# Patient Record
Sex: MALE | Race: WHITE | NOT HISPANIC OR LATINO | Employment: OTHER | URBAN - NONMETROPOLITAN AREA
[De-identification: names, ages, dates, MRNs, and addresses within clinical notes are randomized per-mention and may not be internally consistent; named-entity substitution may affect disease eponyms.]

---

## 2017-01-01 ENCOUNTER — APPOINTMENT (OUTPATIENT)
Dept: GENERAL RADIOLOGY | Facility: HOSPITAL | Age: 82
End: 2017-01-01

## 2017-01-01 ENCOUNTER — HOSPITAL ENCOUNTER (OUTPATIENT)
Dept: CT IMAGING | Facility: HOSPITAL | Age: 82
Discharge: HOME | End: 2017-07-05
Attending: INTERNAL MEDICINE

## 2017-01-01 ENCOUNTER — OFFICE VISIT (OUTPATIENT)
Dept: PULMONOLOGY | Facility: CLINIC | Age: 82
End: 2017-01-01

## 2017-01-01 ENCOUNTER — HOSPITAL ENCOUNTER (OUTPATIENT)
Dept: CT IMAGING | Facility: HOSPITAL | Age: 82
Discharge: HOME | End: 2017-07-05
Attending: INTERNAL MEDICINE | Admitting: INTERNAL MEDICINE

## 2017-01-01 ENCOUNTER — HOSPITAL ENCOUNTER (OUTPATIENT)
Facility: HOSPITAL | Age: 82
Setting detail: HOSPITAL OUTPATIENT SURGERY
Discharge: HOME | End: 2017-07-20
Attending: INTERNAL MEDICINE | Admitting: INTERNAL MEDICINE

## 2017-01-01 ENCOUNTER — ANESTHESIA EVENT (OUTPATIENT)
Dept: PERIOP | Facility: HOSPITAL | Age: 82
End: 2017-01-01

## 2017-01-01 ENCOUNTER — DOCUMENTATION (OUTPATIENT)
Dept: PULMONOLOGY | Facility: CLINIC | Age: 82
End: 2017-01-01

## 2017-01-01 ENCOUNTER — PREP FOR SURGERY (OUTPATIENT)
Dept: OTHER | Facility: HOSPITAL | Age: 82
End: 2017-01-01

## 2017-01-01 ENCOUNTER — HOSPITAL ENCOUNTER (INPATIENT)
Facility: HOSPITAL | Age: 82
LOS: 1 days | Discharge: HOSPICE | End: 2017-08-23
Attending: FAMILY MEDICINE | Admitting: INTERNAL MEDICINE

## 2017-01-01 ENCOUNTER — TRANSCRIBE ORDERS (OUTPATIENT)
Dept: ADMINISTRATIVE | Facility: HOSPITAL | Age: 82
End: 2017-01-01

## 2017-01-01 ENCOUNTER — HOSPITAL ENCOUNTER (INPATIENT)
Facility: HOSPITAL | Age: 82
LOS: 1 days | End: 2017-09-28
Attending: FAMILY MEDICINE | Admitting: INTERNAL MEDICINE

## 2017-01-01 ENCOUNTER — TELEPHONE (OUTPATIENT)
Dept: PULMONOLOGY | Facility: CLINIC | Age: 82
End: 2017-01-01

## 2017-01-01 ENCOUNTER — ANESTHESIA (OUTPATIENT)
Dept: PERIOP | Facility: HOSPITAL | Age: 82
End: 2017-01-01

## 2017-01-01 VITALS
TEMPERATURE: 97.6 F | OXYGEN SATURATION: 96 % | HEART RATE: 85 BPM | DIASTOLIC BLOOD PRESSURE: 77 MMHG | WEIGHT: 174.1 LBS | HEIGHT: 72 IN | BODY MASS INDEX: 23.58 KG/M2 | SYSTOLIC BLOOD PRESSURE: 143 MMHG | RESPIRATION RATE: 20 BRPM

## 2017-01-01 VITALS
HEIGHT: 72 IN | HEART RATE: 86 BPM | SYSTOLIC BLOOD PRESSURE: 116 MMHG | DIASTOLIC BLOOD PRESSURE: 76 MMHG | OXYGEN SATURATION: 90 % | BODY MASS INDEX: 23.03 KG/M2 | RESPIRATION RATE: 12 BRPM | WEIGHT: 170 LBS | TEMPERATURE: 98 F

## 2017-01-01 VITALS
DIASTOLIC BLOOD PRESSURE: 74 MMHG | RESPIRATION RATE: 20 BRPM | SYSTOLIC BLOOD PRESSURE: 138 MMHG | OXYGEN SATURATION: 92 % | WEIGHT: 173 LBS | TEMPERATURE: 99.1 F | HEIGHT: 72 IN | HEART RATE: 69 BPM | BODY MASS INDEX: 23.43 KG/M2

## 2017-01-01 VITALS
HEIGHT: 72 IN | DIASTOLIC BLOOD PRESSURE: 60 MMHG | WEIGHT: 178 LBS | OXYGEN SATURATION: 95 % | SYSTOLIC BLOOD PRESSURE: 116 MMHG | BODY MASS INDEX: 24.11 KG/M2 | HEART RATE: 84 BPM | TEMPERATURE: 98.1 F

## 2017-01-01 VITALS
OXYGEN SATURATION: 92 % | SYSTOLIC BLOOD PRESSURE: 124 MMHG | HEART RATE: 83 BPM | BODY MASS INDEX: 23.43 KG/M2 | WEIGHT: 173 LBS | RESPIRATION RATE: 18 BRPM | DIASTOLIC BLOOD PRESSURE: 62 MMHG | HEIGHT: 72 IN

## 2017-01-01 DIAGNOSIS — J44.9 CHRONIC OBSTRUCTIVE PULMONARY DISEASE, UNSPECIFIED COPD TYPE (HCC): ICD-10-CM

## 2017-01-01 DIAGNOSIS — R42 DIZZINESS: ICD-10-CM

## 2017-01-01 DIAGNOSIS — R42 DIZZINESS: Primary | ICD-10-CM

## 2017-01-01 DIAGNOSIS — R59.0 MEDIASTINAL LYMPHADENOPATHY: ICD-10-CM

## 2017-01-01 DIAGNOSIS — R63.4 WEIGHT LOSS, UNINTENTIONAL: ICD-10-CM

## 2017-01-01 DIAGNOSIS — N18.9 CKD (CHRONIC KIDNEY DISEASE), UNSPECIFIED STAGE: ICD-10-CM

## 2017-01-01 DIAGNOSIS — G44.89 OTHER HEADACHE SYNDROME: ICD-10-CM

## 2017-01-01 DIAGNOSIS — G44.89 OTHER HEADACHE SYNDROME: Primary | ICD-10-CM

## 2017-01-01 DIAGNOSIS — C34.91 SMALL CELL CARCINOMA OF RIGHT LUNG (HCC): ICD-10-CM

## 2017-01-01 DIAGNOSIS — R59.0 MEDIASTINAL LYMPHADENOPATHY: Primary | ICD-10-CM

## 2017-01-01 DIAGNOSIS — R91.8 LUNG MASS: Primary | ICD-10-CM

## 2017-01-01 DIAGNOSIS — R07.9 CHEST PAIN WITH LOW RISK FOR CARDIAC ETIOLOGY: ICD-10-CM

## 2017-01-01 DIAGNOSIS — R59.0 MEDIASTINAL ADENOPATHY: ICD-10-CM

## 2017-01-01 DIAGNOSIS — R06.02 SHORTNESS OF BREATH: ICD-10-CM

## 2017-01-01 DIAGNOSIS — R93.89 ABNORMAL CT OF THE CHEST: ICD-10-CM

## 2017-01-01 DIAGNOSIS — J41.0 SIMPLE CHRONIC BRONCHITIS (HCC): ICD-10-CM

## 2017-01-01 DIAGNOSIS — J18.9 COMMUNITY ACQUIRED PNEUMONIA: Primary | ICD-10-CM

## 2017-01-01 DIAGNOSIS — C34.90 SMALL CELL LUNG CANCER, UNSPECIFIED LATERALITY (HCC): Primary | ICD-10-CM

## 2017-01-01 DIAGNOSIS — R52 ACUTE PAIN: Primary | ICD-10-CM

## 2017-01-01 LAB
6-ACETYL MORPHINE: NEGATIVE
A-A DO2: 33.2 MMHG (ref 0–300)
ALBUMIN SERPL-MCNC: 4 G/DL (ref 3.4–4.8)
ALBUMIN/GLOB SERPL: 1.2 G/DL (ref 1.5–2.5)
ALP SERPL-CCNC: 121 U/L (ref 40–129)
ALT SERPL W P-5'-P-CCNC: 26 U/L (ref 10–44)
AMPHET+METHAMPHET UR QL: NEGATIVE
ANION GAP SERPL CALCULATED.3IONS-SCNC: 11.2 MMOL/L
ANION GAP SERPL CALCULATED.3IONS-SCNC: 6.1 MMOL/L (ref 3.6–11.2)
ANION GAP SERPL CALCULATED.3IONS-SCNC: 7.4 MMOL/L (ref 3.6–11.2)
APTT PPP: 29.1 SECONDS (ref 23.8–36.1)
ARTERIAL PATENCY WRIST A: POSITIVE
AST SERPL-CCNC: 43 U/L (ref 10–34)
ATMOSPHERIC PRESS: 731 MMHG
BACTERIA SPEC AEROBE CULT: NO GROWTH
BACTERIA SPEC AEROBE CULT: NORMAL
BACTERIA SPEC AEROBE CULT: NORMAL
BARBITURATES UR QL SCN: NEGATIVE
BASE EXCESS BLDA CALC-SCNC: -0.4 MMOL/L
BASOPHILS # BLD AUTO: 0.03 10*3/MM3 (ref 0–0.3)
BASOPHILS NFR BLD AUTO: 0.5 % (ref 0–2)
BDY SITE: ABNORMAL
BENZODIAZ UR QL SCN: POSITIVE
BILIRUB SERPL-MCNC: 0.8 MG/DL (ref 0.2–1.8)
BILIRUB UR QL STRIP: NEGATIVE
BNP SERPL-MCNC: 470 PG/ML (ref 0–100)
BODY TEMPERATURE: 98.7 C
BUN BLD-MCNC: 22 MG/DL (ref 7–20)
BUN BLD-MCNC: 27 MG/DL (ref 7–21)
BUN BLD-MCNC: 32 MG/DL (ref 7–21)
BUN/CREAT SERPL: 18.3 (ref 6.3–21.9)
BUN/CREAT SERPL: 20.1 (ref 7–25)
BUN/CREAT SERPL: 25.6 (ref 7–25)
BUPRENORPHINE SERPL-MCNC: NEGATIVE NG/ML
CALCIUM SPEC-SCNC: 9.1 MG/DL (ref 7.7–10)
CALCIUM SPEC-SCNC: 9.3 MG/DL (ref 8.4–10.2)
CALCIUM SPEC-SCNC: 9.4 MG/DL (ref 7.7–10)
CANNABINOIDS SERPL QL: NEGATIVE
CHLORIDE SERPL-SCNC: 106 MMOL/L (ref 98–107)
CHLORIDE SERPL-SCNC: 111 MMOL/L (ref 99–112)
CHLORIDE SERPL-SCNC: 112 MMOL/L (ref 99–112)
CK MB SERPL-CCNC: 2.51 NG/ML (ref 0–5)
CK MB SERPL-CCNC: 2.88 NG/ML (ref 0–5)
CK MB SERPL-RTO: 2.3 % (ref 0–3)
CK MB SERPL-RTO: 2.4 % (ref 0–3)
CK SERPL-CCNC: 105 U/L (ref 24–204)
CK SERPL-CCNC: 124 U/L (ref 24–204)
CLARITY UR: CLEAR
CO2 SERPL-SCNC: 23.6 MMOL/L (ref 24.3–31.9)
CO2 SERPL-SCNC: 28.9 MMOL/L (ref 24.3–31.9)
CO2 SERPL-SCNC: 29 MMOL/L (ref 26–30)
COCAINE UR QL: NEGATIVE
COHGB MFR BLD: 1.4 % (ref 0–5)
COLOR UR: ABNORMAL
CREAT BLD-MCNC: 1.2 MG/DL (ref 0.6–1.3)
CREAT BLD-MCNC: 1.25 MG/DL (ref 0.43–1.29)
CREAT BLD-MCNC: 1.34 MG/DL (ref 0.43–1.29)
CREAT BLDA-MCNC: 1.2 MG/DL (ref 0.6–1.3)
DEPRECATED RDW RBC AUTO: 45.4 FL (ref 37–54)
DEPRECATED RDW RBC AUTO: 47.7 FL (ref 37–54)
DEPRECATED RDW RBC AUTO: 47.8 FL (ref 37–54)
EOSINOPHIL # BLD AUTO: 0.03 10*3/MM3 (ref 0–0.7)
EOSINOPHIL NFR BLD AUTO: 0.5 % (ref 0–7)
ERYTHROCYTE [DISTWIDTH] IN BLOOD BY AUTOMATED COUNT: 13.9 % (ref 11.5–14.5)
ERYTHROCYTE [DISTWIDTH] IN BLOOD BY AUTOMATED COUNT: 14.7 % (ref 11.5–14.5)
ERYTHROCYTE [DISTWIDTH] IN BLOOD BY AUTOMATED COUNT: 14.7 % (ref 11.5–14.5)
GFR SERPL CREATININE-BSD FRML MDRD: 51 ML/MIN/1.73
GFR SERPL CREATININE-BSD FRML MDRD: 55 ML/MIN/1.73
GFR SERPL CREATININE-BSD FRML MDRD: 58 ML/MIN/1.73
GLOBULIN UR ELPH-MCNC: 3.3 GM/DL
GLUCOSE BLD-MCNC: 101 MG/DL (ref 70–110)
GLUCOSE BLD-MCNC: 104 MG/DL (ref 74–98)
GLUCOSE BLD-MCNC: 97 MG/DL (ref 70–110)
GLUCOSE UR STRIP-MCNC: NEGATIVE MG/DL
HCO3 BLDA-SCNC: 23.1 MMOL/L (ref 22–26)
HCT VFR BLD AUTO: 39.4 % (ref 42–52)
HCT VFR BLD AUTO: 41.7 % (ref 42–52)
HCT VFR BLD AUTO: 42.5 % (ref 42–52)
HCT VFR BLD CALC: 39 % (ref 42–52)
HGB BLD-MCNC: 12.2 G/DL (ref 14–18)
HGB BLD-MCNC: 12.9 G/DL (ref 14–18)
HGB BLD-MCNC: 13.2 G/DL (ref 14–18)
HGB BLDA-MCNC: 13.3 G/DL (ref 12–16)
HGB UR QL STRIP.AUTO: NEGATIVE
HOLD SPECIMEN: NORMAL
HOLD SPECIMEN: NORMAL
HOROWITZ INDEX BLD+IHG-RTO: 21 %
IMM GRANULOCYTES # BLD: 0.02 10*3/MM3 (ref 0–0.03)
IMM GRANULOCYTES NFR BLD: 0.3 % (ref 0–0.5)
INR PPP: 1.2 (ref 0.9–1.1)
KETONES UR QL STRIP: NEGATIVE
LAB AP CASE REPORT: NORMAL
LAB AP CASE REPORT: NORMAL
LAB AP CLINICAL INFORMATION: NORMAL
LAB AP CLINICAL INFORMATION: NORMAL
LAB AP DIAGNOSIS COMMENT: NORMAL
LEUKOCYTE ESTERASE UR QL STRIP.AUTO: NEGATIVE
LYMPHOCYTES # BLD AUTO: 2.33 10*3/MM3 (ref 1–3)
LYMPHOCYTES NFR BLD AUTO: 38.1 % (ref 16–46)
Lab: NORMAL
Lab: NORMAL
MCH RBC QN AUTO: 27.9 PG (ref 27–31)
MCH RBC QN AUTO: 28.2 PG (ref 27–33)
MCH RBC QN AUTO: 28.5 PG (ref 27–33)
MCHC RBC AUTO-ENTMCNC: 30.9 G/DL (ref 33–37)
MCHC RBC AUTO-ENTMCNC: 31 G/DL (ref 33–37)
MCHC RBC AUTO-ENTMCNC: 31.1 G/DL (ref 30–37)
MCV RBC AUTO: 89.9 FL (ref 80–94)
MCV RBC AUTO: 91.2 FL (ref 80–94)
MCV RBC AUTO: 92.1 FL (ref 80–94)
METHADONE UR QL SCN: NEGATIVE
METHGB BLD QL: 0.2 % (ref 0–3)
MODALITY: ABNORMAL
MONOCYTES # BLD AUTO: 0.56 10*3/MM3 (ref 0.1–0.9)
MONOCYTES NFR BLD AUTO: 9.2 % (ref 0–12)
NEUTROPHILS # BLD AUTO: 3.15 10*3/MM3 (ref 1.4–6.5)
NEUTROPHILS NFR BLD AUTO: 51.4 % (ref 40–75)
NITRITE UR QL STRIP: NEGATIVE
OPIATES UR QL: NEGATIVE
OSMOLALITY SERPL CALC.SUM OF ELEC: 290.2 MOSM/KG (ref 273–305)
OSMOLALITY SERPL CALC.SUM OF ELEC: 297.5 MOSM/KG (ref 273–305)
OXYCODONE UR QL SCN: NEGATIVE
OXYHGB MFR BLDV: 93 % (ref 85–100)
PCO2 BLDA: 34.3 MM HG (ref 35–45)
PCP UR QL SCN: NEGATIVE
PH BLDA: 7.45 PH UNITS (ref 7.35–7.45)
PH UR STRIP.AUTO: 6.5 [PH] (ref 5–8)
PLATELET # BLD AUTO: 175 10*3/MM3 (ref 130–400)
PLATELET # BLD AUTO: 177 10*3/MM3 (ref 130–400)
PLATELET # BLD AUTO: 209 10*3/MM3 (ref 130–400)
PMV BLD AUTO: 10.1 FL (ref 6–12)
PMV BLD AUTO: 10.6 FL (ref 6–10)
PMV BLD AUTO: 10.8 FL (ref 6–10)
PO2 BLDA: 68.7 MM HG (ref 80–100)
POTASSIUM BLD-SCNC: 4.1 MMOL/L (ref 3.5–5.3)
POTASSIUM BLD-SCNC: 4.7 MMOL/L (ref 3.5–5.3)
POTASSIUM BLD-SCNC: 5.2 MMOL/L (ref 3.5–5.1)
PROT SERPL-MCNC: 7.3 G/DL (ref 6–8)
PROT UR QL STRIP: ABNORMAL
PROTHROMBIN TIME: 15.3 SECONDS (ref 11–15.4)
RBC # BLD AUTO: 4.28 10*6/MM3 (ref 4.7–6.1)
RBC # BLD AUTO: 4.57 10*6/MM3 (ref 4.7–6.1)
RBC # BLD AUTO: 4.73 10*6/MM3 (ref 4.7–6.1)
SAO2 % BLDCOA: 94.5 % (ref 90–100)
SODIUM BLD-SCNC: 141 MMOL/L (ref 137–145)
SODIUM BLD-SCNC: 142 MMOL/L (ref 135–153)
SODIUM BLD-SCNC: 147 MMOL/L (ref 135–153)
SP GR UR STRIP: 1.02 (ref 1–1.03)
T4 SERPL-MCNC: 7.2 MCG/DL (ref 4.5–10.9)
TROPONIN I SERPL-MCNC: 0.01 NG/ML
TROPONIN I SERPL-MCNC: 0.01 NG/ML
TROPONIN I SERPL-MCNC: <0.006 NG/ML
TSH SERPL DL<=0.05 MIU/L-ACNC: 2.64 MIU/ML (ref 0.55–4.78)
UROBILINOGEN UR QL STRIP: ABNORMAL
WBC NRBC COR # BLD: 6.12 10*3/MM3 (ref 4.5–12.5)
WBC NRBC COR # BLD: 8.06 10*3/MM3 (ref 4.8–10.8)
WBC NRBC COR # BLD: 9.54 10*3/MM3 (ref 4.5–12.5)
WHOLE BLOOD HOLD SPECIMEN: NORMAL
WHOLE BLOOD HOLD SPECIMEN: NORMAL

## 2017-01-01 PROCEDURE — 94799 UNLISTED PULMONARY SVC/PX: CPT

## 2017-01-01 PROCEDURE — 87040 BLOOD CULTURE FOR BACTERIA: CPT | Performed by: FAMILY MEDICINE

## 2017-01-01 PROCEDURE — 71260 CT THORAX DX C+: CPT

## 2017-01-01 PROCEDURE — 74160 CT ABDOMEN W/CONTRAST: CPT | Performed by: RADIOLOGY

## 2017-01-01 PROCEDURE — 71010 HC CHEST PA OR AP: CPT

## 2017-01-01 PROCEDURE — 80307 DRUG TEST PRSMV CHEM ANLYZR: CPT | Performed by: FAMILY MEDICINE

## 2017-01-01 PROCEDURE — 85027 COMPLETE CBC AUTOMATED: CPT | Performed by: INTERNAL MEDICINE

## 2017-01-01 PROCEDURE — 85610 PROTHROMBIN TIME: CPT | Performed by: FAMILY MEDICINE

## 2017-01-01 PROCEDURE — 25010000002 FENTANYL CITRATE (PF) 100 MCG/2ML SOLUTION: Performed by: NURSE ANESTHETIST, CERTIFIED REGISTERED

## 2017-01-01 PROCEDURE — 0 IOPAMIDOL 61 % SOLUTION: Performed by: INTERNAL MEDICINE

## 2017-01-01 PROCEDURE — 25010000002 PROPOFOL 200 MG/20ML EMULSION: Performed by: NURSE ANESTHETIST, CERTIFIED REGISTERED

## 2017-01-01 PROCEDURE — 93010 ELECTROCARDIOGRAM REPORT: CPT | Performed by: INTERNAL MEDICINE

## 2017-01-01 PROCEDURE — 31624 DX BRONCHOSCOPE/LAVAGE: CPT | Performed by: INTERNAL MEDICINE

## 2017-01-01 PROCEDURE — 82550 ASSAY OF CK (CPK): CPT | Performed by: INTERNAL MEDICINE

## 2017-01-01 PROCEDURE — 25010000002 MORPHINE SULFATE (PF) 2 MG/ML SOLUTION: Performed by: FAMILY MEDICINE

## 2017-01-01 PROCEDURE — 25010000002 SUCCINYLCHOLINE PER 20 MG: Performed by: NURSE ANESTHETIST, CERTIFIED REGISTERED

## 2017-01-01 PROCEDURE — 80048 BASIC METABOLIC PNL TOTAL CA: CPT | Performed by: INTERNAL MEDICINE

## 2017-01-01 PROCEDURE — 25010000002 HYDROMORPHONE PER 4 MG: Performed by: INTERNAL MEDICINE

## 2017-01-01 PROCEDURE — 25010000002 CEFTRIAXONE: Performed by: INTERNAL MEDICINE

## 2017-01-01 PROCEDURE — 70460 CT HEAD/BRAIN W/DYE: CPT | Performed by: RADIOLOGY

## 2017-01-01 PROCEDURE — 99284 EMERGENCY DEPT VISIT MOD MDM: CPT

## 2017-01-01 PROCEDURE — 99204 OFFICE O/P NEW MOD 45 MIN: CPT | Performed by: INTERNAL MEDICINE

## 2017-01-01 PROCEDURE — 25010000002 METHYLPREDNISOLONE PER 125 MG: Performed by: FAMILY MEDICINE

## 2017-01-01 PROCEDURE — 84484 ASSAY OF TROPONIN QUANT: CPT | Performed by: FAMILY MEDICINE

## 2017-01-01 PROCEDURE — 84484 ASSAY OF TROPONIN QUANT: CPT | Performed by: INTERNAL MEDICINE

## 2017-01-01 PROCEDURE — 85730 THROMBOPLASTIN TIME PARTIAL: CPT | Performed by: FAMILY MEDICINE

## 2017-01-01 PROCEDURE — 82565 ASSAY OF CREATININE: CPT

## 2017-01-01 PROCEDURE — 25010000002 HALOPERIDOL LACTATE PER 5 MG: Performed by: INTERNAL MEDICINE

## 2017-01-01 PROCEDURE — 94640 AIRWAY INHALATION TREATMENT: CPT

## 2017-01-01 PROCEDURE — 83880 ASSAY OF NATRIURETIC PEPTIDE: CPT | Performed by: FAMILY MEDICINE

## 2017-01-01 PROCEDURE — 25010000002 MIDAZOLAM PER 1 MG: Performed by: NURSE ANESTHETIST, CERTIFIED REGISTERED

## 2017-01-01 PROCEDURE — 80048 BASIC METABOLIC PNL TOTAL CA: CPT | Performed by: NURSE ANESTHETIST, CERTIFIED REGISTERED

## 2017-01-01 PROCEDURE — 25010000002 DIGOXIN PER 500 MCG: Performed by: INTERNAL MEDICINE

## 2017-01-01 PROCEDURE — 70460 CT HEAD/BRAIN W/DYE: CPT

## 2017-01-01 PROCEDURE — 88173 CYTOPATH EVAL FNA REPORT: CPT | Performed by: INTERNAL MEDICINE

## 2017-01-01 PROCEDURE — 25010000002 AZITHROMYCIN: Performed by: INTERNAL MEDICINE

## 2017-01-01 PROCEDURE — 82553 CREATINE MB FRACTION: CPT | Performed by: INTERNAL MEDICINE

## 2017-01-01 PROCEDURE — 81003 URINALYSIS AUTO W/O SCOPE: CPT | Performed by: FAMILY MEDICINE

## 2017-01-01 PROCEDURE — 93005 ELECTROCARDIOGRAM TRACING: CPT | Performed by: INTERNAL MEDICINE

## 2017-01-01 PROCEDURE — 74160 CT ABDOMEN W/CONTRAST: CPT

## 2017-01-01 PROCEDURE — 88112 CYTOPATH CELL ENHANCE TECH: CPT | Performed by: INTERNAL MEDICINE

## 2017-01-01 PROCEDURE — 84436 ASSAY OF TOTAL THYROXINE: CPT | Performed by: INTERNAL MEDICINE

## 2017-01-01 PROCEDURE — 82553 CREATINE MB FRACTION: CPT | Performed by: FAMILY MEDICINE

## 2017-01-01 PROCEDURE — 99283 EMERGENCY DEPT VISIT LOW MDM: CPT

## 2017-01-01 PROCEDURE — 88342 IMHCHEM/IMCYTCHM 1ST ANTB: CPT | Performed by: INTERNAL MEDICINE

## 2017-01-01 PROCEDURE — 88177 CYTP FNA EVAL EA ADDL: CPT | Performed by: INTERNAL MEDICINE

## 2017-01-01 PROCEDURE — G0378 HOSPITAL OBSERVATION PER HR: HCPCS

## 2017-01-01 PROCEDURE — C1726 CATH, BAL DIL, NON-VASCULAR: HCPCS | Performed by: INTERNAL MEDICINE

## 2017-01-01 PROCEDURE — 25010000002 ENOXAPARIN PER 10 MG: Performed by: INTERNAL MEDICINE

## 2017-01-01 PROCEDURE — 83050 HGB METHEMOGLOBIN QUAN: CPT | Performed by: FAMILY MEDICINE

## 2017-01-01 PROCEDURE — 25010000002 LORAZEPAM PER 2 MG: Performed by: INTERNAL MEDICINE

## 2017-01-01 PROCEDURE — 25010000002 DEXAMETHASONE PER 1 MG: Performed by: NURSE ANESTHETIST, CERTIFIED REGISTERED

## 2017-01-01 PROCEDURE — 88341 IMHCHEM/IMCYTCHM EA ADD ANTB: CPT | Performed by: INTERNAL MEDICINE

## 2017-01-01 PROCEDURE — 84443 ASSAY THYROID STIM HORMONE: CPT | Performed by: INTERNAL MEDICINE

## 2017-01-01 PROCEDURE — 99215 OFFICE O/P EST HI 40 MIN: CPT | Performed by: INTERNAL MEDICINE

## 2017-01-01 PROCEDURE — 31652 BRONCH EBUS SAMPLNG 1/2 NODE: CPT | Performed by: INTERNAL MEDICINE

## 2017-01-01 PROCEDURE — 71020 XR CHEST 2 VW: CPT | Performed by: RADIOLOGY

## 2017-01-01 PROCEDURE — 87086 URINE CULTURE/COLONY COUNT: CPT | Performed by: FAMILY MEDICINE

## 2017-01-01 PROCEDURE — 80053 COMPREHEN METABOLIC PANEL: CPT | Performed by: FAMILY MEDICINE

## 2017-01-01 PROCEDURE — 25010000002 LEVOFLOXACIN PER 250 MG: Performed by: FAMILY MEDICINE

## 2017-01-01 PROCEDURE — 88305 TISSUE EXAM BY PATHOLOGIST: CPT | Performed by: INTERNAL MEDICINE

## 2017-01-01 PROCEDURE — 82375 ASSAY CARBOXYHB QUANT: CPT | Performed by: FAMILY MEDICINE

## 2017-01-01 PROCEDURE — 25010000002 ONDANSETRON PER 1 MG: Performed by: NURSE ANESTHETIST, CERTIFIED REGISTERED

## 2017-01-01 PROCEDURE — 36600 WITHDRAWAL OF ARTERIAL BLOOD: CPT | Performed by: FAMILY MEDICINE

## 2017-01-01 PROCEDURE — 82550 ASSAY OF CK (CPK): CPT | Performed by: FAMILY MEDICINE

## 2017-01-01 PROCEDURE — 71020 HC CHEST PA AND LATERAL: CPT

## 2017-01-01 PROCEDURE — 82805 BLOOD GASES W/O2 SATURATION: CPT | Performed by: FAMILY MEDICINE

## 2017-01-01 PROCEDURE — 93005 ELECTROCARDIOGRAM TRACING: CPT

## 2017-01-01 PROCEDURE — 85025 COMPLETE CBC W/AUTO DIFF WBC: CPT | Performed by: FAMILY MEDICINE

## 2017-01-01 PROCEDURE — 36415 COLL VENOUS BLD VENIPUNCTURE: CPT

## 2017-01-01 PROCEDURE — 71260 CT THORAX DX C+: CPT | Performed by: RADIOLOGY

## 2017-01-01 RX ORDER — SODIUM CHLORIDE 9 MG/ML
42 INJECTION, SOLUTION INTRAVENOUS CONTINUOUS
Status: DISCONTINUED | OUTPATIENT
Start: 2017-01-01 | End: 2017-01-01 | Stop reason: HOSPADM

## 2017-01-01 RX ORDER — SODIUM CHLORIDE 0.9 % (FLUSH) 0.9 %
10 SYRINGE (ML) INJECTION AS NEEDED
Status: DISCONTINUED | OUTPATIENT
Start: 2017-01-01 | End: 2017-01-01 | Stop reason: HOSPADM

## 2017-01-01 RX ORDER — LIDOCAINE HYDROCHLORIDE 20 MG/ML
INJECTION, SOLUTION INFILTRATION; PERINEURAL
Status: DISCONTINUED
Start: 2017-01-01 | End: 2017-01-01 | Stop reason: HOSPADM

## 2017-01-01 RX ORDER — ATORVASTATIN CALCIUM 20 MG/1
20 TABLET, FILM COATED ORAL NIGHTLY
Status: DISCONTINUED | OUTPATIENT
Start: 2017-01-01 | End: 2017-01-01 | Stop reason: HOSPADM

## 2017-01-01 RX ORDER — DIAZEPAM 5 MG/1
5 TABLET ORAL NIGHTLY PRN
Status: DISCONTINUED | OUTPATIENT
Start: 2017-01-01 | End: 2017-01-01 | Stop reason: HOSPADM

## 2017-01-01 RX ORDER — CARVEDILOL 25 MG/1
25 TABLET ORAL 2 TIMES DAILY
Status: DISCONTINUED | OUTPATIENT
Start: 2017-01-01 | End: 2017-01-01 | Stop reason: HOSPADM

## 2017-01-01 RX ORDER — FENTANYL CITRATE 50 UG/ML
INJECTION, SOLUTION INTRAMUSCULAR; INTRAVENOUS AS NEEDED
Status: DISCONTINUED | OUTPATIENT
Start: 2017-01-01 | End: 2017-01-01 | Stop reason: SURG

## 2017-01-01 RX ORDER — PANTOPRAZOLE SODIUM 40 MG/1
40 TABLET, DELAYED RELEASE ORAL
Status: DISCONTINUED | OUTPATIENT
Start: 2017-01-01 | End: 2017-01-01 | Stop reason: HOSPADM

## 2017-01-01 RX ORDER — METHYLPREDNISOLONE SODIUM SUCCINATE 125 MG/2ML
125 INJECTION, POWDER, LYOPHILIZED, FOR SOLUTION INTRAMUSCULAR; INTRAVENOUS ONCE
Status: COMPLETED | OUTPATIENT
Start: 2017-01-01 | End: 2017-01-01

## 2017-01-01 RX ORDER — LIDOCAINE HYDROCHLORIDE 20 MG/ML
INJECTION, SOLUTION INFILTRATION; PERINEURAL AS NEEDED
Status: DISCONTINUED | OUTPATIENT
Start: 2017-01-01 | End: 2017-01-01 | Stop reason: HOSPADM

## 2017-01-01 RX ORDER — HYDROCODONE BITARTRATE AND ACETAMINOPHEN 7.5; 325 MG/1; MG/1
1 TABLET ORAL EVERY 4 HOURS PRN
COMMUNITY

## 2017-01-01 RX ORDER — DEXAMETHASONE SODIUM PHOSPHATE 4 MG/ML
INJECTION, SOLUTION INTRA-ARTICULAR; INTRALESIONAL; INTRAMUSCULAR; INTRAVENOUS; SOFT TISSUE AS NEEDED
Status: DISCONTINUED | OUTPATIENT
Start: 2017-01-01 | End: 2017-01-01 | Stop reason: SURG

## 2017-01-01 RX ORDER — FINASTERIDE 5 MG/1
5 TABLET, FILM COATED ORAL DAILY
Status: DISCONTINUED | OUTPATIENT
Start: 2017-01-01 | End: 2017-01-01 | Stop reason: HOSPADM

## 2017-01-01 RX ORDER — PROPOFOL 10 MG/ML
INJECTION, EMULSION INTRAVENOUS AS NEEDED
Status: DISCONTINUED | OUTPATIENT
Start: 2017-01-01 | End: 2017-01-01 | Stop reason: SURG

## 2017-01-01 RX ORDER — NEOSTIGMINE METHYLSULFATE 5 MG/5 ML
SYRINGE (ML) INTRAVENOUS AS NEEDED
Status: DISCONTINUED | OUTPATIENT
Start: 2017-01-01 | End: 2017-01-01 | Stop reason: SURG

## 2017-01-01 RX ORDER — AMLODIPINE BESYLATE 10 MG/1
10 TABLET ORAL DAILY
COMMUNITY
End: 2017-01-01 | Stop reason: HOSPADM

## 2017-01-01 RX ORDER — SODIUM CHLORIDE 9 MG/ML
42 INJECTION, SOLUTION INTRAVENOUS CONTINUOUS
Status: CANCELLED | OUTPATIENT
Start: 2017-01-01

## 2017-01-01 RX ORDER — CLOPIDOGREL BISULFATE 75 MG/1
75 TABLET ORAL DAILY
Status: DISCONTINUED | OUTPATIENT
Start: 2017-01-01 | End: 2017-01-01 | Stop reason: HOSPADM

## 2017-01-01 RX ORDER — AMLODIPINE BESYLATE 10 MG/1
10 TABLET ORAL DAILY
Status: DISCONTINUED | OUTPATIENT
Start: 2017-01-01 | End: 2017-01-01 | Stop reason: HOSPADM

## 2017-01-01 RX ORDER — NITROGLYCERIN 0.4 MG/1
0.4 TABLET SUBLINGUAL
Qty: 25 TABLET | Refills: 12 | Status: ON HOLD | OUTPATIENT
Start: 2017-01-01 | End: 2017-01-01

## 2017-01-01 RX ORDER — ONDANSETRON 2 MG/ML
4 INJECTION INTRAMUSCULAR; INTRAVENOUS ONCE
Status: DISCONTINUED | OUTPATIENT
Start: 2017-01-01 | End: 2017-01-01 | Stop reason: HOSPADM

## 2017-01-01 RX ORDER — FINASTERIDE 5 MG/1
5 TABLET, FILM COATED ORAL DAILY
Status: ON HOLD | COMMUNITY
Start: 2017-01-01 | End: 2017-01-01

## 2017-01-01 RX ORDER — NITROGLYCERIN 0.4 MG/1
0.4 TABLET SUBLINGUAL
Status: DISCONTINUED | OUTPATIENT
Start: 2017-01-01 | End: 2017-01-01 | Stop reason: HOSPADM

## 2017-01-01 RX ORDER — IPRATROPIUM BROMIDE AND ALBUTEROL SULFATE 2.5; .5 MG/3ML; MG/3ML
3 SOLUTION RESPIRATORY (INHALATION) ONCE AS NEEDED
Status: DISCONTINUED | OUTPATIENT
Start: 2017-01-01 | End: 2017-01-01 | Stop reason: HOSPADM

## 2017-01-01 RX ORDER — LEVOFLOXACIN 5 MG/ML
750 INJECTION, SOLUTION INTRAVENOUS ONCE
Status: COMPLETED | OUTPATIENT
Start: 2017-01-01 | End: 2017-01-01

## 2017-01-01 RX ORDER — ONDANSETRON 2 MG/ML
INJECTION INTRAMUSCULAR; INTRAVENOUS AS NEEDED
Status: DISCONTINUED | OUTPATIENT
Start: 2017-01-01 | End: 2017-01-01 | Stop reason: SURG

## 2017-01-01 RX ORDER — IPRATROPIUM BROMIDE AND ALBUTEROL SULFATE 2.5; .5 MG/3ML; MG/3ML
3 SOLUTION RESPIRATORY (INHALATION) ONCE
Status: COMPLETED | OUTPATIENT
Start: 2017-01-01 | End: 2017-01-01

## 2017-01-01 RX ORDER — IPRATROPIUM BROMIDE AND ALBUTEROL SULFATE 2.5; .5 MG/3ML; MG/3ML
3 SOLUTION RESPIRATORY (INHALATION)
Status: DISCONTINUED | OUTPATIENT
Start: 2017-01-01 | End: 2017-01-01

## 2017-01-01 RX ORDER — IPRATROPIUM BROMIDE AND ALBUTEROL SULFATE 2.5; .5 MG/3ML; MG/3ML
3 SOLUTION RESPIRATORY (INHALATION)
Status: DISCONTINUED | OUTPATIENT
Start: 2017-01-01 | End: 2017-01-01 | Stop reason: HOSPADM

## 2017-01-01 RX ORDER — ROCURONIUM BROMIDE 10 MG/ML
INJECTION, SOLUTION INTRAVENOUS AS NEEDED
Status: DISCONTINUED | OUTPATIENT
Start: 2017-01-01 | End: 2017-01-01 | Stop reason: SURG

## 2017-01-01 RX ORDER — SUCCINYLCHOLINE CHLORIDE 20 MG/ML
INJECTION INTRAMUSCULAR; INTRAVENOUS AS NEEDED
Status: DISCONTINUED | OUTPATIENT
Start: 2017-01-01 | End: 2017-01-01 | Stop reason: SURG

## 2017-01-01 RX ORDER — TERAZOSIN 1 MG/1
2 CAPSULE ORAL NIGHTLY
Status: DISCONTINUED | OUTPATIENT
Start: 2017-01-01 | End: 2017-01-01 | Stop reason: HOSPADM

## 2017-01-01 RX ORDER — LORAZEPAM 2 MG/ML
2 INJECTION INTRAMUSCULAR
Status: DISCONTINUED | OUTPATIENT
Start: 2017-01-01 | End: 2017-01-01 | Stop reason: HOSPADM

## 2017-01-01 RX ORDER — MIDAZOLAM HYDROCHLORIDE 1 MG/ML
INJECTION INTRAMUSCULAR; INTRAVENOUS AS NEEDED
Status: DISCONTINUED | OUTPATIENT
Start: 2017-01-01 | End: 2017-01-01 | Stop reason: SURG

## 2017-01-01 RX ORDER — MAGNESIUM HYDROXIDE 1200 MG/15ML
LIQUID ORAL AS NEEDED
Status: DISCONTINUED | OUTPATIENT
Start: 2017-01-01 | End: 2017-01-01 | Stop reason: HOSPADM

## 2017-01-01 RX ORDER — ASPIRIN 81 MG/1
324 TABLET, CHEWABLE ORAL ONCE
Status: COMPLETED | OUTPATIENT
Start: 2017-01-01 | End: 2017-01-01

## 2017-01-01 RX ORDER — DIAZEPAM 5 MG/1
5 TABLET ORAL EVERY 8 HOURS PRN
COMMUNITY

## 2017-01-01 RX ORDER — HALOPERIDOL 1 MG/1
2 TABLET ORAL EVERY 6 HOURS SCHEDULED
COMMUNITY

## 2017-01-01 RX ORDER — BENZONATATE 100 MG/1
200 CAPSULE ORAL 3 TIMES DAILY PRN
Status: DISCONTINUED | OUTPATIENT
Start: 2017-01-01 | End: 2017-01-01 | Stop reason: HOSPADM

## 2017-01-01 RX ORDER — HYDROMORPHONE HCL 110MG/55ML
2 PATIENT CONTROLLED ANALGESIA SYRINGE INTRAVENOUS
Status: DISCONTINUED | OUTPATIENT
Start: 2017-01-01 | End: 2017-01-01 | Stop reason: HOSPADM

## 2017-01-01 RX ORDER — DIGOXIN 0.25 MG/ML
125 INJECTION INTRAMUSCULAR; INTRAVENOUS EVERY 4 HOURS
Status: COMPLETED | OUTPATIENT
Start: 2017-01-01 | End: 2017-01-01

## 2017-01-01 RX ORDER — HALOPERIDOL 5 MG/ML
2 INJECTION INTRAMUSCULAR EVERY 6 HOURS PRN
Status: DISCONTINUED | OUTPATIENT
Start: 2017-01-01 | End: 2017-01-01 | Stop reason: HOSPADM

## 2017-01-01 RX ORDER — DILTIAZEM HYDROCHLORIDE 120 MG/1
120 CAPSULE, COATED, EXTENDED RELEASE ORAL DAILY
Qty: 30 CAPSULE | Refills: 5 | Status: ON HOLD | OUTPATIENT
Start: 2017-01-01 | End: 2017-01-01

## 2017-01-01 RX ORDER — DILTIAZEM HYDROCHLORIDE 120 MG/1
120 CAPSULE, COATED, EXTENDED RELEASE ORAL ONCE
Status: COMPLETED | OUTPATIENT
Start: 2017-01-01 | End: 2017-01-01

## 2017-01-01 RX ORDER — SODIUM CHLORIDE 9 MG/ML
125 INJECTION, SOLUTION INTRAVENOUS CONTINUOUS
Status: DISCONTINUED | OUTPATIENT
Start: 2017-01-01 | End: 2017-01-01 | Stop reason: HOSPADM

## 2017-01-01 RX ORDER — GLYCOPYRROLATE 0.2 MG/ML
INJECTION INTRAMUSCULAR; INTRAVENOUS AS NEEDED
Status: DISCONTINUED | OUTPATIENT
Start: 2017-01-01 | End: 2017-01-01 | Stop reason: SURG

## 2017-01-01 RX ORDER — MORPHINE SULFATE 2 MG/ML
1 INJECTION, SOLUTION INTRAMUSCULAR; INTRAVENOUS ONCE
Status: COMPLETED | OUTPATIENT
Start: 2017-01-01 | End: 2017-01-01

## 2017-01-01 RX ADMIN — GLYCOPYRROLATE 0.8 MG: 0.2 INJECTION, SOLUTION INTRAMUSCULAR; INTRAVENOUS at 14:02

## 2017-01-01 RX ADMIN — DIAZEPAM 5 MG: 5 TABLET ORAL at 20:47

## 2017-01-01 RX ADMIN — IPRATROPIUM BROMIDE AND ALBUTEROL SULFATE 3 ML: .5; 3 SOLUTION RESPIRATORY (INHALATION) at 18:32

## 2017-01-01 RX ADMIN — DILTIAZEM HYDROCHLORIDE 5 MG/HR: 100 INJECTION, POWDER, LYOPHILIZED, FOR SOLUTION INTRAVENOUS at 20:47

## 2017-01-01 RX ADMIN — DIGOXIN 125 MCG: 0.25 INJECTION INTRAMUSCULAR; INTRAVENOUS at 08:51

## 2017-01-01 RX ADMIN — CARVEDILOL 25 MG: 25 TABLET, FILM COATED ORAL at 09:17

## 2017-01-01 RX ADMIN — TERAZOSIN HYDROCHLORIDE 2 MG: 1 CAPSULE ORAL at 21:38

## 2017-01-01 RX ADMIN — AMLODIPINE BESYLATE 10 MG: 10 TABLET ORAL at 08:51

## 2017-01-01 RX ADMIN — GUAIFENESIN 200 MG: 100 SOLUTION ORAL at 05:41

## 2017-01-01 RX ADMIN — Medication 4 MG: at 14:02

## 2017-01-01 RX ADMIN — CEFTRIAXONE 1 G: 1 INJECTION, POWDER, FOR SOLUTION INTRAMUSCULAR; INTRAVENOUS at 20:48

## 2017-01-01 RX ADMIN — APIXABAN 5 MG: 5 TABLET, FILM COATED ORAL at 09:17

## 2017-01-01 RX ADMIN — TERAZOSIN HYDROCHLORIDE 2 MG: 1 CAPSULE ORAL at 20:48

## 2017-01-01 RX ADMIN — IPRATROPIUM BROMIDE AND ALBUTEROL SULFATE 3 ML: .5; 3 SOLUTION RESPIRATORY (INHALATION) at 06:24

## 2017-01-01 RX ADMIN — HALOPERIDOL LACTATE 2 MG: 5 INJECTION, SOLUTION INTRAMUSCULAR at 16:14

## 2017-01-01 RX ADMIN — SUCCINYLCHOLINE CHLORIDE 80 MG: 20 INJECTION, SOLUTION INTRAMUSCULAR; INTRAVENOUS at 12:49

## 2017-01-01 RX ADMIN — DIGOXIN 125 MCG: 0.25 INJECTION INTRAMUSCULAR; INTRAVENOUS at 16:43

## 2017-01-01 RX ADMIN — Medication 100 MCG: at 13:09

## 2017-01-01 RX ADMIN — DILTIAZEM HYDROCHLORIDE 5 MG/HR: 100 INJECTION, POWDER, LYOPHILIZED, FOR SOLUTION INTRAVENOUS at 06:03

## 2017-01-01 RX ADMIN — CLOPIDOGREL BISULFATE 75 MG: 75 TABLET, FILM COATED ORAL at 09:17

## 2017-01-01 RX ADMIN — APIXABAN 5 MG: 5 TABLET, FILM COATED ORAL at 08:51

## 2017-01-01 RX ADMIN — SODIUM CHLORIDE 42 ML/HR: 9 INJECTION, SOLUTION INTRAVENOUS at 10:54

## 2017-01-01 RX ADMIN — Medication 100 MCG: at 13:12

## 2017-01-01 RX ADMIN — HYDROMORPHONE HYDROCHLORIDE 2 MG: 2 INJECTION, SOLUTION INTRAMUSCULAR; INTRAVENOUS; SUBCUTANEOUS at 02:22

## 2017-01-01 RX ADMIN — IPRATROPIUM BROMIDE AND ALBUTEROL SULFATE 3 ML: .5; 3 SOLUTION RESPIRATORY (INHALATION) at 06:20

## 2017-01-01 RX ADMIN — FENTANYL CITRATE 50 MCG: 50 INJECTION, SOLUTION INTRAMUSCULAR; INTRAVENOUS at 12:48

## 2017-01-01 RX ADMIN — MORPHINE SULFATE 1 MG: 2 INJECTION, SOLUTION INTRAMUSCULAR; INTRAVENOUS at 15:10

## 2017-01-01 RX ADMIN — HYDROMORPHONE HYDROCHLORIDE 2 MG: 2 INJECTION, SOLUTION INTRAMUSCULAR; INTRAVENOUS; SUBCUTANEOUS at 20:30

## 2017-01-01 RX ADMIN — Medication 100 MCG: at 13:18

## 2017-01-01 RX ADMIN — HYDROMORPHONE HYDROCHLORIDE 2 MG: 2 INJECTION, SOLUTION INTRAMUSCULAR; INTRAVENOUS; SUBCUTANEOUS at 00:28

## 2017-01-01 RX ADMIN — IPRATROPIUM BROMIDE AND ALBUTEROL SULFATE 3 ML: .5; 3 SOLUTION RESPIRATORY (INHALATION) at 00:40

## 2017-01-01 RX ADMIN — CEFTRIAXONE 1 G: 1 INJECTION, POWDER, FOR SOLUTION INTRAMUSCULAR; INTRAVENOUS at 20:35

## 2017-01-01 RX ADMIN — IOPAMIDOL 80 ML: 612 INJECTION, SOLUTION INTRAVENOUS at 10:00

## 2017-01-01 RX ADMIN — SODIUM CHLORIDE 500 ML: 9 INJECTION, SOLUTION INTRAVENOUS at 14:48

## 2017-01-01 RX ADMIN — HYDROMORPHONE HYDROCHLORIDE 2 MG: 2 INJECTION, SOLUTION INTRAMUSCULAR; INTRAVENOUS; SUBCUTANEOUS at 18:29

## 2017-01-01 RX ADMIN — IPRATROPIUM BROMIDE AND ALBUTEROL SULFATE 3 ML: .5; 3 SOLUTION RESPIRATORY (INHALATION) at 16:03

## 2017-01-01 RX ADMIN — ENOXAPARIN SODIUM 40 MG: 40 INJECTION SUBCUTANEOUS at 21:39

## 2017-01-01 RX ADMIN — ASPIRIN 324 MG: 81 TABLET, CHEWABLE ORAL at 14:48

## 2017-01-01 RX ADMIN — IPRATROPIUM BROMIDE AND ALBUTEROL SULFATE 3 ML: .5; 3 SOLUTION RESPIRATORY (INHALATION) at 02:18

## 2017-01-01 RX ADMIN — FINASTERIDE 5 MG: 5 TABLET, FILM COATED ORAL at 08:51

## 2017-01-01 RX ADMIN — APIXABAN 5 MG: 5 TABLET, FILM COATED ORAL at 20:48

## 2017-01-01 RX ADMIN — CARVEDILOL 25 MG: 25 TABLET, FILM COATED ORAL at 08:51

## 2017-01-01 RX ADMIN — PANTOPRAZOLE SODIUM 40 MG: 40 TABLET, DELAYED RELEASE ORAL at 06:22

## 2017-01-01 RX ADMIN — Medication 100 MCG: at 13:35

## 2017-01-01 RX ADMIN — CLOPIDOGREL BISULFATE 75 MG: 75 TABLET, FILM COATED ORAL at 08:51

## 2017-01-01 RX ADMIN — FENTANYL CITRATE 50 MCG: 50 INJECTION, SOLUTION INTRAMUSCULAR; INTRAVENOUS at 12:44

## 2017-01-01 RX ADMIN — ONDANSETRON 4 MG: 2 INJECTION INTRAMUSCULAR; INTRAVENOUS at 13:06

## 2017-01-01 RX ADMIN — PROPOFOL 100 MG: 10 INJECTION, EMULSION INTRAVENOUS at 12:49

## 2017-01-01 RX ADMIN — Medication 100 MCG: at 13:30

## 2017-01-01 RX ADMIN — CARVEDILOL 25 MG: 25 TABLET, FILM COATED ORAL at 16:43

## 2017-01-01 RX ADMIN — AZITHROMYCIN 500 MG: 500 INJECTION, POWDER, LYOPHILIZED, FOR SOLUTION INTRAVENOUS at 21:40

## 2017-01-01 RX ADMIN — SODIUM CHLORIDE: 9 INJECTION, SOLUTION INTRAVENOUS at 13:50

## 2017-01-01 RX ADMIN — DILTIAZEM HYDROCHLORIDE 120 MG: 120 CAPSULE, COATED, EXTENDED RELEASE ORAL at 09:19

## 2017-01-01 RX ADMIN — GUAIFENESIN 200 MG: 100 SOLUTION ORAL at 00:16

## 2017-01-01 RX ADMIN — DEXAMETHASONE SODIUM PHOSPHATE 8 MG: 4 INJECTION, SOLUTION INTRAMUSCULAR; INTRAVENOUS at 13:06

## 2017-01-01 RX ADMIN — IPRATROPIUM BROMIDE AND ALBUTEROL SULFATE 3 ML: .5; 3 SOLUTION RESPIRATORY (INHALATION) at 19:34

## 2017-01-01 RX ADMIN — SODIUM CHLORIDE 125 ML/HR: 9 INJECTION, SOLUTION INTRAVENOUS at 15:16

## 2017-01-01 RX ADMIN — METHYLPREDNISOLONE SODIUM SUCCINATE 125 MG: 125 INJECTION, POWDER, FOR SOLUTION INTRAMUSCULAR; INTRAVENOUS at 16:20

## 2017-01-01 RX ADMIN — AZITHROMYCIN 500 MG: 500 INJECTION, POWDER, LYOPHILIZED, FOR SOLUTION INTRAVENOUS at 20:59

## 2017-01-01 RX ADMIN — HYDROMORPHONE HYDROCHLORIDE 2 MG: 2 INJECTION, SOLUTION INTRAMUSCULAR; INTRAVENOUS; SUBCUTANEOUS at 16:14

## 2017-01-01 RX ADMIN — LIDOCAINE HYDROCHLORIDE 40 MG: 20 INJECTION, SOLUTION INTRAVENOUS at 12:48

## 2017-01-01 RX ADMIN — ROCURONIUM BROMIDE 10 MG: 10 INJECTION INTRAVENOUS at 12:53

## 2017-01-01 RX ADMIN — DIAZEPAM 5 MG: 5 TABLET ORAL at 22:32

## 2017-01-01 RX ADMIN — Medication: at 08:00

## 2017-01-01 RX ADMIN — AMLODIPINE BESYLATE 10 MG: 10 TABLET ORAL at 09:17

## 2017-01-01 RX ADMIN — Medication 100 MCG: at 13:15

## 2017-01-01 RX ADMIN — HYDROMORPHONE HYDROCHLORIDE 2 MG: 2 INJECTION, SOLUTION INTRAMUSCULAR; INTRAVENOUS; SUBCUTANEOUS at 22:35

## 2017-01-01 RX ADMIN — LEVOFLOXACIN 750 MG: 5 INJECTION, SOLUTION INTRAVENOUS at 17:38

## 2017-01-01 RX ADMIN — PANTOPRAZOLE SODIUM 40 MG: 40 TABLET, DELAYED RELEASE ORAL at 05:41

## 2017-01-01 RX ADMIN — Medication 100 MCG: at 13:04

## 2017-01-01 RX ADMIN — ATORVASTATIN CALCIUM 20 MG: 20 TABLET, FILM COATED ORAL at 21:37

## 2017-01-01 RX ADMIN — MIDAZOLAM HYDROCHLORIDE 2 MG: 1 INJECTION, SOLUTION INTRAMUSCULAR; INTRAVENOUS at 12:42

## 2017-01-01 RX ADMIN — IPRATROPIUM BROMIDE AND ALBUTEROL SULFATE 3 ML: .5; 3 SOLUTION RESPIRATORY (INHALATION) at 13:00

## 2017-01-01 RX ADMIN — DIGOXIN 125 MCG: 0.25 INJECTION INTRAMUSCULAR; INTRAVENOUS at 12:44

## 2017-01-01 RX ADMIN — IPRATROPIUM BROMIDE AND ALBUTEROL SULFATE 3 ML: .5; 3 SOLUTION RESPIRATORY (INHALATION) at 22:08

## 2017-01-01 RX ADMIN — ROCURONIUM BROMIDE 10 MG: 10 INJECTION INTRAVENOUS at 12:48

## 2017-01-01 RX ADMIN — FINASTERIDE 5 MG: 5 TABLET, FILM COATED ORAL at 09:17

## 2017-01-01 RX ADMIN — ATORVASTATIN CALCIUM 20 MG: 20 TABLET, FILM COATED ORAL at 20:48

## 2017-07-07 NOTE — PROGRESS NOTES
Subjective   Angelo Hung is a 83 y.o. male who is being seen for Abnormal CT    History of Present Illness   This 83-year-old gentleman has been referred to us by his primary care provider Dr. Reina for evaluation and management in view of an abnormal CT scan of the chest.  Patient tells me that lately he has not feeling good and was having increased cough and shortness of breath as well as some abdominal complaints.  He had a CT abdomen as well as a CT chest.  The chest CT revealed significant abdominal findings and hence he was sent to us for further evaluation and management.    Patient is accompanied by his wife.  Wife tells that he is losing weight, lost approximately 22 pounds over last 3 months.  His appetite is also very poor.  She tells me that he has shortness of breath but that is mostly exertional.  Has some dry cough.    Past Medical History:   Diagnosis Date   • Coronary artery disease    • Hyperlipidemia    • Hypertension    • Pneumonia      Past Surgical History:   Procedure Laterality Date   • CORONARY ARTERY BYPASS GRAFT      3 bypasses     History reviewed. No pertinent family history.   reports that he has quit smoking. His smoking use included Cigarettes. He does not have any smokeless tobacco history on file. He reports that he does not drink alcohol or use illicit drugs.  Allergies   Allergen Reactions   • Demerol [Meperidine]          The following portions of the patient's history were reviewed and updated as appropriate: allergies, current medications, past family history, past medical history, past social history, past surgical history and problem list.    Review of Systems   Constitutional: Negative for appetite change, chills, diaphoresis and unexpected weight change.   HENT: Negative for sore throat, trouble swallowing and voice change.    Eyes: Negative for visual disturbance.   Respiratory: Positive for shortness of breath and wheezing (Worse in the morning). Negative for apnea,  "cough and choking.    Cardiovascular: Negative for chest pain, palpitations and leg swelling.   Gastrointestinal: Negative for abdominal pain, constipation, diarrhea, nausea and vomiting.   Endocrine: Negative for cold intolerance, heat intolerance, polydipsia, polyphagia and polyuria.   Genitourinary: Negative for difficulty urinating and dysuria.   Musculoskeletal: Negative for gait problem.   Skin: Negative for rash and wound.   Neurological: Negative for syncope and light-headedness.   Hematological: Negative for adenopathy.   Psychiatric/Behavioral: Negative for agitation, behavioral problems and confusion.   All other systems reviewed and are negative.      Objective   /60 (BP Location: Left arm, Patient Position: Sitting, Cuff Size: Adult)  Pulse 84  Temp 98.1 °F (36.7 °C) (Oral)   Ht 72\" (182.9 cm)  Wt 178 lb (80.7 kg)  SpO2 95%  BMI 24.14 kg/m2  Physical Exam   Constitutional: He is oriented to person, place, and time. He appears well-developed.   HENT:   Head: Normocephalic.   Nose: Mucosal edema present.   Mouth/Throat: Oropharynx is clear and moist.   Eyes: EOM are normal. Pupils are equal, round, and reactive to light.   Neck: Normal range of motion.   Cardiovascular: Normal rate, regular rhythm and normal heart sounds.    Pulmonary/Chest: He has rales (Rt base).   Abdominal: Soft. Bowel sounds are normal.   Neurological: He is alert and oriented to person, place, and time. He has normal reflexes.   Skin: Skin is warm and dry.   Psychiatric: He has a normal mood and affect. His behavior is normal.   Nursing note and vitals reviewed.        Radiology:  Ct Head With Contrast    Result Date: 7/5/2017  Continued diffuse generalized cerebral atrophy with white matter disease. No focal brain parenchymal abnormalities or areas of abnormal enhancement were demonstrated.  885.89 mGy.cm The radiation dose reduction device was utilized for each scan per the ALARA (as low as reasonably achievable) " protocol.  This report was finalized on 7/5/2017 9:44 AM by Dr. Deni Kramer II, MD.      Ct Chest With Contrast    Result Date: 7/5/2017    CT of the chest shows adenopathy in the mediastinum concerning for tumor. There are markedly enlarged lymph nodes in the right hilum. There is interstitial lung disease with minimal pneumonia in the middle and lower lobe on the right. There are small bilateral pleural effusions. Bronchoscopy may be beneficial in evaluating the source of the patient's persistent pneumonia and adenopathy.  672.64 mGy.cm The radiation dose reduction device was utilized for each scan per the ALARA (as low as reasonably achievable) protocol.  This report was finalized on 7/5/2017 9:44 AM by Dr. Deni Kramer II, MD.      Ct Abdomen With Contrast    Result Date: 7/5/2017  Small low density areas were seen in the liver felt to represent hepatic cysts. There was a large calcified stone in the gallbladder. There was diverticulosis throughout the sigmoid colon.  672.64 mGy.cm The radiation dose reduction device was utilized for each scan per the ALARA (as low as reasonably achievable) protocol.  This report was finalized on 7/5/2017 10:54 AM by Dr. Deni Kramer II, MD.        Lab Results:  Hospital Outpatient Visit on 07/05/2017   Component Date Value Ref Range Status   • Creatinine 07/05/2017 1.20  0.60 - 1.30 mg/dL Final       Assessment      ICD-10-CM ICD-9-CM   1. Lung mass R91.8 786.6   2. Mediastinal adenopathy R59.0 785.6   3. Simple chronic bronchitis J41.0 491.0                DISCUSSION:  I have reviewed the CT scan of the chest.  Patient has some worrisome finding, there is a markedly enlarged right hilar adenopathy/mass.  There is also a soft tissue density on the left side, pleural-based laterality.  This findings are worrisome for neoplastic process.  I had good discussion with the patient and his wife.  Patient tells me that he does not want to go for any further procedure at this  point for diagnostic purpose.  Wife is also thinking in the same line.  I explained to them that this may turn out to be cancer and if we wait too long it may pose a problem, early intervention is better.  Patient still remains adamant that he does not want to go for any diagnostic procedure or any management plan at this point.  But he mentioned that he is going to talk to his other family members as well as his primary care provider about this and get back to us.    Patient is complaining of low shortness of breath which is mostly exertional.  He may have underlying obstructive airways disease.  I'm sending him for a pulmonary function test for objective assessment of his airways function.  Meanwhile I'm putting him on Breo 100 µg 1 puff daily and albuterol inhaler 2 puffs on a when necessary basis.    Plan    Orders Placed This Encounter   Procedures   • Pulmonary Function Test     No orders of the defined types were placed in this encounter.                 Vasquez Hart MD, FCCP, FAASM  Pulmonary, Critical Care, and Sleep Medicine

## 2017-07-11 NOTE — PROGRESS NOTES
CONSULT NOTE    Chief Complaint   Patient presents with   • Consult     Lung mass   • Shortness of Breath       Subjective   Angelo Hung is a 83 y.o. male.     History of Present Illness   Patient comes in today for consultation because of abnormal CT of the chest.    The patient has been having shortness of breath along with 30 pound weight loss and since he was a heavy smoker in the past, 3 packs per day with 30 years having quit 40 years ago, a CT of the chest was ordered by his primary care provider.    The patient denies any fever, night sweats or chills but he does admit to poor appetite over the past 3-4 months.  He also denies any significant cough or phlegm production.    As far as shortness of breath is concerned, it has been progressive for the past 8-12 months but over the past 3 months it has definitely worsened.  The patient was recently started on some inhaled medication by his pulmonologist and seems to have benefited.    He denies any known family history of lung cancer.    The following portions of the patient's history were reviewed and updated as appropriate: allergies, current medications, past family history, past medical history, past social history and past surgical history.    Review of Systems   Constitutional: Positive for fatigue. Negative for chills and fever.   HENT: Positive for sneezing, trouble swallowing and voice change. Negative for congestion, sinus pressure and sore throat.    Respiratory: Positive for cough, shortness of breath and wheezing. Negative for chest tightness.    Cardiovascular: Positive for chest pain. Negative for palpitations and leg swelling.   Psychiatric/Behavioral: Negative for sleep disturbance.   All other systems reviewed and are negative.      Past Medical History:   Diagnosis Date   • Coronary artery disease    • Hyperlipidemia    • Hypertension    • Pneumonia        Social History   Substance Use Topics   • Smoking status: Former Smoker     Packs/day:  "3.00     Types: Cigarettes   • Smokeless tobacco: Former User   • Alcohol use No         Objective   Visit Vitals   • /62   • Pulse 83   • Resp 18   • Ht 72\" (182.9 cm)   • Wt 173 lb (78.5 kg)   • SpO2 92%   • BMI 23.46 kg/m2       Physical Exam   Constitutional: He is oriented to person, place, and time. He appears well-developed.   HENT:   Dentures noted.  Hearing was impaired.   Eyes: EOM are normal.   Neck: Neck supple. No JVD present.   Cardiovascular: Normal rate.    No murmur heard.  Irregular   Pulmonary/Chest: Effort normal. He has wheezes. He has no rales.   Somewhat hyperresonant to percussion.  Decreased Air Entry Bilaterally.   Musculoskeletal:   Gait was normal.   Neurological: He is alert and oriented to person, place, and time.   Skin: Skin is warm and dry.   Psychiatric: He has a normal mood and affect. His behavior is normal.   Vitals reviewed.      Assessment/Plan   Angelo was seen today for consult and shortness of breath.    Diagnoses and all orders for this visit:    Mediastinal lymphadenopathy    Abnormal CT of the chest    Shortness of breath    Chronic obstructive pulmonary disease, unspecified COPD type         Return in about 4 weeks (around 8/8/2017) for Recheck, Overbook.    DISCUSSION(if any):  I have reviewed office notes from his recent pulmonology visit close to his home and shared the fact that he was recommended to undergo biopsy.  It also mentions that he was started on Breo.     I have reviewed the CT images personally and shared them with the patient and his family members.  I have told the patient that he definitely has multiple mediastinal lymph nodes along with subtle abnormalities within pulmonary parenchyma.    The differential diagnosis for diffuse lymphadenopathy includes malignancy of multiple abrasions including primary bronchogenic carcinoma versus lymphoma etc.      Although infection also remains in differential diagnosis, he does not manifest any symptoms to " suggest the same.  In any event, the patient will definitely need further workup and this can be performed with a bronchoscopy along with endobronchial ultrasound-guided needle biopsy.  The samples will definitely be sent for pathology and cultures.    After much deliberation the patient and his family have agreed to proceed with endobronchial ultrasound-guided biopsy and this will be arranged after the patient has received cardiology evaluation and possible clearance to hold the Plavix for at least 5 days and aspirin for possibly 7 days, if possible.    The patient will definitely need to at least hold the Plavix for 3-5 days before the procedure can be attempted.    He also will need cardiac evaluation due to irregular heartbeat and an EKG last year that suggested possibility of atrial fibrillation, although based on my evaluation, there were definite P waves on that EKG?    I have discussed the case in great detail with the patient & his family members.     Once we have cardiac evaluation on this patient, we will proceed with the procedure as listed above.      The risks of bronchoscopy and EBUS including but not limited to damage to the overlying structures, pneumothorax, bleeding, worsening of respiratory failure, requirement for chest tube placement among others were explained.    Patient understood the risks and benefits and agreed to proceed with the procedure    The alternatives were also discussed with the patient    Errors in dictation may reflect use of voice recognition software and not all errors in transcription may have been detected prior to signing    This document was electronically signed by Gisel Hines MD July 11, 2017  1:13 PM

## 2017-07-13 NOTE — TELEPHONE ENCOUNTER
I spoke with patient daughter Nidia, she was told patient will need to stop all blood thinner 5 day's before procedure.  I called Dr Morgan office for clearance letter.

## 2017-07-20 NOTE — PLAN OF CARE
Problem: Perioperative Period (Adult)  Intervention: Promote Pulmonary Hygiene and Secretion Clearance    07/20/17 1440   Promote Aggressive Pulmonary Hygiene/Secretion Management   Cough And Deep Breathing done independently per patient   Positioning   Head Of Bed (HOB) Position HOB at 45 degrees   Activity   Activity Type bedrest       Intervention: Monitor/Manage Pain    07/20/17 1440   Safety Interventions   Medication Review/Management medications reviewed       Intervention: Promote Normothermia    07/20/17 1417   Cardiac Interventions   Warming Thermoregulation Maintenance skin exposure time minimized

## 2017-07-20 NOTE — PLAN OF CARE
Problem: Patient Care Overview (Adult)  Goal: Plan of Care Review  Outcome: Ongoing (interventions implemented as appropriate)    07/20/17 1517   Coping/Psychosocial Response Interventions   Plan Of Care Reviewed With patient   Outcome Evaluation   Outcome Summary/Follow up Plan PACU DISCHARGE CRITERIA MET.

## 2017-07-20 NOTE — ANESTHESIA POSTPROCEDURE EVALUATION
Patient: Angelo Hung    Procedure Summary     Date Anesthesia Start Anesthesia Stop Room / Location    07/20/17 1242  [facility]       Procedure Diagnosis Surgeon Provider    BRONCHOSCOPY WITH RIGHT LUNG WASHINGS;  ENDOBRONCHIAL ULTRASOUND UNDER FLUOROSCOPY GUIDANCE  (N/A Bronchus) Mediastinal lymphadenopathy  (Mediastinal lymphadenopathy [R59.0]) MD Deni French CRNA          Anesthesia Type: MAC  Last vitals  BP        Temp        Pulse       Resp        SpO2          Post Anesthesia Care and Evaluation    Patient location during evaluation: PACU  Patient participation: complete - patient participated  Level of consciousness: awake  Pain score: 0  Pain management: adequate  Airway patency: patent  Anesthetic complications: No anesthetic complications  PONV Status: none  Cardiovascular status: acceptable  Respiratory status: acceptable and face mask  Hydration status: acceptable    Comments: vsss resp spont, reflexes intact, responsive, report given to pacu nurse

## 2017-07-20 NOTE — NURSING NOTE
1430- RADIOLOGY INTO PACU , CHEST X-RAY DONE TOLERATED WELL.  1447- DR JHA INTO PACU SPOKE WITH PT.

## 2017-07-20 NOTE — ANESTHESIA PREPROCEDURE EVALUATION
Anesthesia Evaluation     Patient summary reviewed and Nursing notes reviewed   no history of anesthetic complications:  NPO Solid Status: > 8 hours  NPO Liquid Status: > 8 hours     Airway   Mallampati: I  TM distance: >3 FB  Neck ROM: full  no difficulty expected  Dental    (+) lower dentures and upper dentures    Pulmonary - normal exam   (+) pneumonia worsening , a smoker ( 90 pyh) Former, COPD, shortness of breath,   Cardiovascular - normal exam  Exercise tolerance: poor (<4 METS)    ECG reviewed  PT is on anticoagulation therapy    (+) hypertension, past MI (x 2 per daughter)  >12 months, CAD, CABG (2002), cardiac stents Drug eluting stent more than 12 months ago dysrhythmias Atrial Fib, CHF, MONREAL, PVD, hyperlipidemia      Neuro/Psych  (+) CVA (left arm) residual symptoms, psychiatric history Anxiety and Depression,    GI/Hepatic/Renal/Endo      Musculoskeletal     (+) arthralgias, back pain, chronic pain,   Abdominal  - normal exam   Substance History      OB/GYN          Other   (+) arthritis     ROS/Med Hx Other: ekg afib, new onset not worked up per daughter who is a cardiac p.a. And is the pt's cardia medical personnel  She state his echo prior to new onset of afib approx 50% and that the only stented vessel open is the pt's la, however pt's preop clearance states stented lmain and lcx and lima to lad patent.  Lab reviewed hh 13/42, plt 209, k 5.2  cxr worsened left lower lobe and righ lobe pneumonia                            Anesthesia Plan    ASA 4     general   (Risks and benefits discussed including risk of aspiration, recall and dental damage. All patient questions answered. Discussed risks with pt., pt extremely high intraop and postop risk for cv, resp, and neuro events, with the possibility of remaking intubated on ventilator in icu as determined by pt's postop condition and Dr. Hines's request. Surgeon aware of recent lab results, and new onset of Afib and preop cardiac evaluation and wishes to  proceed with surgeon's ordered gen anes..  Will continue with POC.)  intravenous induction   Anesthetic plan and risks discussed with patient, spouse/significant other, child and sibling.

## 2017-07-20 NOTE — OP NOTE
Date of Procedure: July 20, 2017      Type:   1. Bronchoscopy with bronchoalveolar lavage  2. Endobronchial ultrasound-guided needle biopsy of 2 separate lymph node station(s)(7 and 4R)     Reason for procedure: Mediastinal Lymphadenopathy.    Consent: Consent was obtained from the patient after explanation of the risks and benefits of the procedure. Risks including but not limited to damage to the overlying structures, pneumothorax, bleeding, infection, worsening of respiratory status, requirement for chest tube placement among others were explained.    Patient and family understood the risks and benefits and agreed to proceed with the procedure    Time Out: Time out was done with the RN and Bronch Technician at the bedside after confirmation of the site of the procedure and name and date of birth with the patient's ID band    Details of the procedure:   General anesthesia was provided by the anesthesia team.  He was intubated with appropriate size ET tube .  Vital signs were monitored by them, as well. Once under anesthesia, the bronchoscope was introduced through the endotracheal tube with visualization of the torsten.     The trachea was central. The torsten was sharp.     Right side was inspected first. The bronchoscope was introduced into the main stem.  No significant abnormality of the Right lung noted although there were some secretions which were suctioned without any difficulty. The bronchoscope was introduced into the main stem.  The right mainstem was inspected and the upper lobe, middle lobe and lower lobe bronchi were inspected. No obvious endobronchial mass was identified.  Bronchial alveolar lavage was collected from the right lower lobe. A total of 120 ml was instilled and return of approximately 20 ml was obtained lavage was collected from the Right lower lobe.    Next, the left side was evaluated.    After this, the procedure was converted over to endobronchial ultrasound scope.  The endobronchial  ultrasound scope was introduced through the endotracheal tube and a general inspection was carried out with ultrasound to identify any significant lesions.      A structure was identified in the station 7 region.  Vascular structures were identified on doppler and once satifactory assessment was made that the structure did appear to be consistent with lymph node, and after a safe window was identified and under live ultrasound guidance a needle biopsy was obtained and on site pathologist evaluated the needle aspirate. Lymph node tissue was identified.     Under continued use of the ultrasound guidance, station 4R was identified. Vascular structures were identified on doppler and once satifactory assessment was made that the structure did appear to be consistent with lymph node, and after a safe window was identified, 2 separate needle aspiration(s) were performed with 3 separate passes made.      The specimens were evaluated by the on site pathologist and presence of lymphatic cells could be confirmed.       A total of 3-4 on-site evaluations from 2 separate sites (Station 7 and 4R) were performed.  The needle aspirations seemed to be from station 7 and station 4R.     Although station 10 R was identified, due to presence of malignant cells on the first 2 lymph nodes and some issues with continued image on the EBUS monitor, when the needle setup was introduced through the scope, it was decided not to obtain sample from that site.    After another general inspection with a ultrasound, and visual inspection of the trachea with the bronchoscope, the procedure was terminated.    The patient overall did not have any significant complications.     The patient will be monitored by anesthesia. The patient will be discharged home once deemed stable by anesthesia team and if the Chest X Ray results do not reveal any complications.     Complications:  No significant immediate complications noted, apart from mentioned  above.    Chest X Ray has been ordered.    Post Op Impression:  1. Mediastinal lymphadenopathy. Malignant.? Path pending.        Gisel Hines MD  07/20/17  2:13 PM

## 2017-07-20 NOTE — DISCHARGE INSTRUCTIONS
To assist you in voiding:  Drink plenty of fluids  Listen to running water while attempting to void.    If you are unable to urinate and you have an uncomfortable urge to void or it has been   6 hours since you were discharged, return to the Emergency [facility]Rest today  No pushing,pulling,tugging,heavy lifting, or strenuous activity   No major decision making,driving,or drinking alcoholic beverages for 24 hours due to the sedation you received  Always use good hand hygiene/washing technique  No driving on pain medications    BRONCHOSCOPY AFTER CARE:  WHAT TO EXPECT AFTER THE PROCEDURE  It is normal to have these symptoms 24-48 hours following your procedure.  * increased cough  * low grade fever  * sore throat or hoarse voice  * small streaks of blood in your thick spit(sputum)  HOME CARE INSTRUCTIONS  * Do not eat or drink anything for two hours (or as instructed by your physician) after your procedure.Then for the first 4 hours cool liquids as tolerated. If you try to eat or drink before the medicine wears off, food or drink could go into your lungs or you could burn your self.  After the numbness is gone and your cough and gag reflexes have returned, you may eat soft food and drink room temperature liquids slowly.  * The day after the procedure, you can go back to your normal diet.  * You may resume normal activities.  * You make take tylenol 650 mg for low grade temperature.  * Keep all follow up visits as directed by your health care provider.  SEED IMMEDIATE MEDICAL CARE IF:  * You experience increasing shortness of breath.  * You become light-headed or faint.  * You have chest pain.  * You cough up more than a small amount of blood( a cup in 6 hours)  * The amount of blood you cough up increases.  MAKE SURE YOU:  * Understand these instructions.  * Will watch your condition.  * Will get help right away if you are not doing well (if unable to contact your provider, return to the ED)  * No driving , no alcohol,  and no major decisions for 24 hours.  * Avoid cigarettes for at least 24 hours.  Plan to stop smoking!

## 2017-07-25 NOTE — PROGRESS NOTES
Discussed the findings of pathology with the patient's family members including her daughter and granddaughter.  I made them aware of the findings of small cell lung cancer on the endobronchial ultrasound-guided needle biopsy in 2 different lymph nodes.  The patient's family wants to discuss this further with their local oncologist and has requested to be referred to Dr. Paniagua.  I discussed the case with Dr. Paniagua over the phone who was kind enough to see the patient.  The patient will be scheduled with his office.

## 2017-08-21 PROBLEM — J18.9 COMMUNITY ACQUIRED PNEUMONIA: Status: ACTIVE | Noted: 2017-01-01

## 2017-08-22 PROBLEM — I48.91 ATRIAL FIBRILLATION (HCC): Status: ACTIVE | Noted: 2017-01-01

## 2017-08-22 PROBLEM — C34.91 SMALL CELL CARCINOMA OF RIGHT LUNG (HCC): Status: ACTIVE | Noted: 2017-01-01

## 2017-09-27 PROBLEM — R52 ACUTE PAIN: Status: ACTIVE | Noted: 2017-01-01

## 2017-10-25 NOTE — DISCHARGE SUMMARY
Date of Admission:   2017  2:40 PM    Date of Discharge:    2017    Discharge Diagnosis:   Active Problems:    Acute pain  End-stage lung cancer      Presenting Problem/History of Present Illness  See History and Physical for Presenting Problems / Illnesses.       Hospital Course  Patient is a 83 y.o. male presented with intractable pain and end-stage hospice secondary to lung cancer.  Patient was kept comfortable.  He  soon after admission..      Discharge Disposition      Discharge Medications   Angelo Hung DASHAWN   Home Medication Instructions TESSA:[id number]    Printed on:10/25/17 0951   Medication Information                      diazePAM (VALIUM) 5 MG tablet  Take 5 mg by mouth Every 8 (Eight) Hours As Needed for Anxiety.             haloperidol (HALDOL) 1 MG tablet  Take 2 mg by mouth Every 6 (Six) Hours.             HYDROcodone-acetaminophen (NORCO) 7.5-325 MG per tablet  Take 1 tablet by mouth Every 4 (Four) Hours As Needed for Moderate Pain  or Severe Pain .             Morphine Sulfate ER 15 MG tablet extended-release 12 hour  Take 1 tablet by mouth Every 8 (Eight) Hours.                 Man Jarrett MD  10/25/17  9:51 AM

## 2017-10-25 NOTE — H&P
Chief complaint : Hospice patient    Subjective     Patient is a 83 y.o. male presented to the emergency room from hospice.  Family requested the patients not done at home.  Patient has history of end-stage lung cancer.  He was admitted with pain control.  Patient is not anxious seen by me during this hospital course and  soon after remission.    Review of Systems unobtainable    Past Medical History  Past Medical History:   Diagnosis Date   • A-fib    • Anxiety and depression    • Arthritis    • Cancer     SMALL CELL LUNG CANCER   • Coronary artery disease     DAUGHTER REPORTS HX OF CABG IN , 3 VESSEL   • Emphysema of lung     DAUGHTER REPORTS VERY MILD   • [place] (hard of hearing)     DOES NOT WEAR HEARING AIDS   • Hyperlipidemia    • Hypertension    • Kidney stones    • Mediastinal lymphadenopathy    • Pneumonia    • Shortness of breath    • Stroke     DAUGHTER REPORTS IN  OR  AFTER CABG.  REPORTS MILD WEAKNESS ON LEFT SIDE NOW.   • Wears glasses    • Wears partial dentures     PARTIAL DENTURES, DAUGHTER REPORTS UPPER AND LOWER     Surgical History  Past Surgical History:   Procedure Laterality Date   • BRONCHOSCOPY N/A 2017    Procedure: BRONCHOSCOPY WITH RIGHT LUNG WASHINGS;  ENDOBRONCHIAL ULTRASOUND UNDER FLUOROSCOPY GUIDANCE ;  Surgeon: Gisel Hines MD;  Location: [facility];  Service:    • CARDIAC SURGERY      Triple Bypass   • CORONARY ANGIOPLASTY WITH STENT PLACEMENT      DAUGHTER REPORTS PATIENT HAS SEVERAL STENTS.  MOST RECENTLY WAS PLACED 3-4 YEARS AGO.   • CORONARY ARTERY BYPASS GRAFT      3 bypasses     Family History  Family History   Problem Relation Age of Onset   • No Known Problems Mother    • No Known Problems Father    • Heart disease Maternal Grandfather    • Heart disease Paternal Grandfather    • Heart failure Sister    • Cancer Daughter    • Breast cancer Daughter    • Heart attack Son      Social History  Social History   Substance Use Topics   •  Smoking status: Former Smoker     Packs/day: 3.00     Years: 30.00     Types: Cigarettes     Quit date:    • Smokeless tobacco: Current User     Types: Chew   • Alcohol use No     Home Medications  No prescriptions prior to admission.         Allergies:  Acetaminophen and Demerol [meperidine]    Objective     Physical Exam:    Physical Exam - patient  soon after admission    Results Review:    I reviewed the patient's clinical results from admission:  Imaging Results (last 72 hours)     ** No results found for the last 72 hours. **          LABS:    Lab Results   Component Value Date    GLUCOSE 101 2017    GLUCOSE 97 2017    GLUCOSE 104 (H) 2017    BUN 32 (H) 2017    CREATININE 1.25 2017    EGFRIFNONA 55 (L) 2017    BCR 25.6 (H) 2017    CO2 23.6 (L) 2017    CALCIUM 9.1 2017    ALBUMIN 4.00 2017    LABIL2 1.2 (L) 2017    AST 43 (H) 2017    ALT 26 2017    WBC 9.54 2017    WBC 6.12 2017    WBC 8.06 2017    HGB 12.2 (L) 2017    HCT 39.4 (L) 2017    MCV 92.1 2017     2017     2017     2017     2017    K 4.1 2017    K 4.7 2017    K 5.2 (H) 2017     2017    ANIONGAP 7.4 2017       Lab Results   Component Value Date    INR 1.20 (H) 2017    INR 1.04 2016    PROTIME 15.3 2017    PROTIME 11.8 2016                 Assessment/Plan     End-stage lung cancer-patient admitted for pain control.      Man Jarrett MD  10/25/17  9:46 AM

## (undated) DEVICE — SINGLE USE SUCTION VALVE MAJ-209: Brand: SINGLE USE SUCTION VALVE (STERILE)

## (undated) DEVICE — TRAP,MUCUS SPECIMEN,40CC: Brand: MEDLINE

## (undated) DEVICE — SINGLE USE BIOPSY VALVE MAJ-210: Brand: SINGLE USE BIOPSY VALVE (STERILE)

## (undated) DEVICE — SYR SLP TP 10ML DISP

## (undated) DEVICE — BRUSH CYTO ENDO CELLEBRITY 1X2MM 140CM

## (undated) DEVICE — CUFF SCD HEMOFORCE SEQ CALF STD MD

## (undated) DEVICE — Device: Brand: BALLOON

## (undated) DEVICE — Device: Brand: MEDEX

## (undated) DEVICE — ADAPT SWVL FIBROPTIC BRONCH

## (undated) DEVICE — SINGLE USE ASPIRATION NEEDLE: Brand: SINGLE USE ASPIRATION NEEDLE

## (undated) DEVICE — PRECISOR BRONCHO BIOPSY ALLIGATOR CUP FORCEPS/CHANNEL-GUARD COATED SHAFT, 1.8 MM X 115 CM: Brand: PRECISOR BRONCHO

## (undated) DEVICE — 3-WAY STOPCOCK: Brand: MAXGUARD

## (undated) DEVICE — SYR SLPTP 30CC

## (undated) DEVICE — MAJ-1414 SINGLE USE ADPATER BIOPSY VALV: Brand: SINGLE USE ADAPTOR BIOPSY VALVE

## (undated) DEVICE — GLV SURG SENSICARE W/ALOE PF LF 7.5 STRL

## (undated) DEVICE — 1860S HEALTH CARE RESPIRATOR N95 120EA/C: Brand: 3M™

## (undated) DEVICE — Device

## (undated) DEVICE — TUBING, SUCTION, 1/4" X 12', STRAIGHT: Brand: MEDLINE